# Patient Record
Sex: FEMALE | Race: BLACK OR AFRICAN AMERICAN | NOT HISPANIC OR LATINO | URBAN - METROPOLITAN AREA
[De-identification: names, ages, dates, MRNs, and addresses within clinical notes are randomized per-mention and may not be internally consistent; named-entity substitution may affect disease eponyms.]

---

## 2017-03-29 ENCOUNTER — INPATIENT (INPATIENT)
Facility: HOSPITAL | Age: 82
LOS: 1 days | Discharge: HOME HEALTH | End: 2017-03-31
Attending: INTERNAL MEDICINE | Admitting: INTERNAL MEDICINE
Payer: MEDICARE

## 2017-03-29 VITALS
OXYGEN SATURATION: 93 % | TEMPERATURE: 98 F | DIASTOLIC BLOOD PRESSURE: 89 MMHG | HEIGHT: 68 IN | SYSTOLIC BLOOD PRESSURE: 169 MMHG | WEIGHT: 149.91 LBS | RESPIRATION RATE: 16 BRPM | HEART RATE: 78 BPM

## 2017-03-29 DIAGNOSIS — Z79.82 LONG TERM (CURRENT) USE OF ASPIRIN: ICD-10-CM

## 2017-03-29 DIAGNOSIS — Z29.9 ENCOUNTER FOR PROPHYLACTIC MEASURES, UNSPECIFIED: ICD-10-CM

## 2017-03-29 DIAGNOSIS — Z74.01 BED CONFINEMENT STATUS: ICD-10-CM

## 2017-03-29 DIAGNOSIS — Z88.1 ALLERGY STATUS TO OTHER ANTIBIOTIC AGENTS STATUS: ICD-10-CM

## 2017-03-29 DIAGNOSIS — F01.50 VASCULAR DEMENTIA WITHOUT BEHAVIORAL DISTURBANCE: ICD-10-CM

## 2017-03-29 DIAGNOSIS — G93.41 METABOLIC ENCEPHALOPATHY: ICD-10-CM

## 2017-03-29 DIAGNOSIS — L89.894 PRESSURE ULCER OF OTHER SITE, STAGE 4: ICD-10-CM

## 2017-03-29 DIAGNOSIS — Z51.5 ENCOUNTER FOR PALLIATIVE CARE: ICD-10-CM

## 2017-03-29 DIAGNOSIS — R53.2 FUNCTIONAL QUADRIPLEGIA: ICD-10-CM

## 2017-03-29 DIAGNOSIS — Z86.73 PERSONAL HISTORY OF TRANSIENT ISCHEMIC ATTACK (TIA), AND CEREBRAL INFARCTION WITHOUT RESIDUAL DEFICITS: ICD-10-CM

## 2017-03-29 DIAGNOSIS — N30.01 ACUTE CYSTITIS WITH HEMATURIA: ICD-10-CM

## 2017-03-29 DIAGNOSIS — Z79.84 LONG TERM (CURRENT) USE OF ORAL HYPOGLYCEMIC DRUGS: ICD-10-CM

## 2017-03-29 DIAGNOSIS — R47.01 APHASIA: ICD-10-CM

## 2017-03-29 DIAGNOSIS — L89.624 PRESSURE ULCER OF LEFT HEEL, STAGE 4: ICD-10-CM

## 2017-03-29 DIAGNOSIS — I69.354 HEMIPLEGIA AND HEMIPARESIS FOLLOWING CEREBRAL INFARCTION AFFECTING LEFT NON-DOMINANT SIDE: ICD-10-CM

## 2017-03-29 DIAGNOSIS — Z66 DO NOT RESUSCITATE: ICD-10-CM

## 2017-03-29 DIAGNOSIS — E11.00 TYPE 2 DIABETES MELLITUS WITH HYPEROSMOLARITY WITHOUT NONKETOTIC HYPERGLYCEMIC-HYPEROSMOLAR COMA (NKHHC): ICD-10-CM

## 2017-03-29 DIAGNOSIS — I63.9 CEREBRAL INFARCTION, UNSPECIFIED: ICD-10-CM

## 2017-03-29 DIAGNOSIS — L89.614 PRESSURE ULCER OF RIGHT HEEL, STAGE 4: ICD-10-CM

## 2017-03-29 DIAGNOSIS — E05.90 THYROTOXICOSIS, UNSPECIFIED WITHOUT THYROTOXIC CRISIS OR STORM: ICD-10-CM

## 2017-03-29 DIAGNOSIS — Z79.4 LONG TERM (CURRENT) USE OF INSULIN: ICD-10-CM

## 2017-03-29 DIAGNOSIS — E13.00 OTHER SPECIFIED DIABETES MELLITUS WITH HYPEROSMOLARITY WITHOUT NONKETOTIC HYPERGLYCEMIC-HYPEROSMOLAR COMA (NKHHC): ICD-10-CM

## 2017-03-29 DIAGNOSIS — B96.1 KLEBSIELLA PNEUMONIAE [K. PNEUMONIAE] AS THE CAUSE OF DISEASES CLASSIFIED ELSEWHERE: ICD-10-CM

## 2017-03-29 LAB
ALBUMIN SERPL ELPH-MCNC: 2.7 G/DL — LOW (ref 3.3–5)
ALP SERPL-CCNC: 79 U/L — SIGNIFICANT CHANGE UP (ref 40–120)
ALT FLD-CCNC: 10 U/L — LOW (ref 12–78)
ANION GAP SERPL CALC-SCNC: 10 MMOL/L — SIGNIFICANT CHANGE UP (ref 5–17)
APPEARANCE UR: ABNORMAL
APTT BLD: 31.3 SEC — SIGNIFICANT CHANGE UP (ref 27.5–37.4)
AST SERPL-CCNC: 15 U/L — SIGNIFICANT CHANGE UP (ref 15–37)
BACTERIA # UR AUTO: ABNORMAL
BASE EXCESS BLDA CALC-SCNC: 3.7 MMOL/L — HIGH (ref -2–2)
BASOPHILS # BLD AUTO: 0.2 K/UL — SIGNIFICANT CHANGE UP (ref 0–0.2)
BASOPHILS NFR BLD AUTO: 2.4 % — HIGH (ref 0–2)
BILIRUB SERPL-MCNC: 0.4 MG/DL — SIGNIFICANT CHANGE UP (ref 0.2–1.2)
BILIRUB UR-MCNC: NEGATIVE — SIGNIFICANT CHANGE UP
BLOOD GAS COMMENTS: SIGNIFICANT CHANGE UP
BLOOD GAS SOURCE: SIGNIFICANT CHANGE UP
BUN SERPL-MCNC: 10 MG/DL — SIGNIFICANT CHANGE UP (ref 7–23)
CALCIUM SERPL-MCNC: 8.7 MG/DL — SIGNIFICANT CHANGE UP (ref 8.5–10.1)
CHLORIDE SERPL-SCNC: 100 MMOL/L — SIGNIFICANT CHANGE UP (ref 96–108)
CK MB BLD-MCNC: 1.5 % — SIGNIFICANT CHANGE UP (ref 0–3.5)
CK MB CFR SERPL CALC: 1.3 NG/ML — SIGNIFICANT CHANGE UP (ref 0.5–3.6)
CK SERPL-CCNC: 84 U/L — SIGNIFICANT CHANGE UP (ref 26–192)
CO2 SERPL-SCNC: 31 MMOL/L — SIGNIFICANT CHANGE UP (ref 22–31)
COLOR SPEC: YELLOW — SIGNIFICANT CHANGE UP
CREAT SERPL-MCNC: 0.67 MG/DL — SIGNIFICANT CHANGE UP (ref 0.5–1.3)
DIFF PNL FLD: ABNORMAL
EOSINOPHIL # BLD AUTO: 0.2 K/UL — SIGNIFICANT CHANGE UP (ref 0–0.5)
EOSINOPHIL NFR BLD AUTO: 2.1 % — SIGNIFICANT CHANGE UP (ref 0–6)
EPI CELLS # UR: ABNORMAL
GLUCOSE SERPL-MCNC: 123 MG/DL — HIGH (ref 70–99)
GLUCOSE UR QL: NEGATIVE MG/DL — SIGNIFICANT CHANGE UP
HCO3 BLDA-SCNC: 27 MMOL/L — SIGNIFICANT CHANGE UP (ref 21–29)
HCT VFR BLD CALC: 39.6 % — SIGNIFICANT CHANGE UP (ref 34.5–45)
HGB BLD-MCNC: 13.4 G/DL — SIGNIFICANT CHANGE UP (ref 11.5–15.5)
HOROWITZ INDEX BLDA+IHG-RTO: 21 — SIGNIFICANT CHANGE UP
INR BLD: 1.11 RATIO — SIGNIFICANT CHANGE UP (ref 0.88–1.16)
KETONES UR-MCNC: NEGATIVE — SIGNIFICANT CHANGE UP
LEUKOCYTE ESTERASE UR-ACNC: ABNORMAL
LYMPHOCYTES # BLD AUTO: 3.8 K/UL — HIGH (ref 1–3.3)
LYMPHOCYTES # BLD AUTO: 45.6 % — HIGH (ref 13–44)
MCHC RBC-ENTMCNC: 30.6 PG — SIGNIFICANT CHANGE UP (ref 27–34)
MCHC RBC-ENTMCNC: 33.9 GM/DL — SIGNIFICANT CHANGE UP (ref 32–36)
MCV RBC AUTO: 90.3 FL — SIGNIFICANT CHANGE UP (ref 80–100)
MONOCYTES # BLD AUTO: 0.7 K/UL — SIGNIFICANT CHANGE UP (ref 0–0.9)
MONOCYTES NFR BLD AUTO: 8.8 % — SIGNIFICANT CHANGE UP (ref 2–14)
NEUTROPHILS # BLD AUTO: 3.5 K/UL — SIGNIFICANT CHANGE UP (ref 1.8–7.4)
NEUTROPHILS NFR BLD AUTO: 41.1 % — LOW (ref 43–77)
NITRITE UR-MCNC: POSITIVE
PCO2 BLDA: 39 MMHG — SIGNIFICANT CHANGE UP (ref 32–46)
PH BLD: 7.46 — HIGH (ref 7.35–7.45)
PH UR: 6.5 — SIGNIFICANT CHANGE UP (ref 4.8–8)
PLATELET # BLD AUTO: 260 K/UL — SIGNIFICANT CHANGE UP (ref 150–400)
PO2 BLDA: 96 MMHG — SIGNIFICANT CHANGE UP (ref 74–108)
POTASSIUM SERPL-MCNC: 4.1 MMOL/L — SIGNIFICANT CHANGE UP (ref 3.5–5.3)
POTASSIUM SERPL-SCNC: 4.1 MMOL/L — SIGNIFICANT CHANGE UP (ref 3.5–5.3)
PROT SERPL-MCNC: 7.1 GM/DL — SIGNIFICANT CHANGE UP (ref 6–8.3)
PROT UR-MCNC: 30 MG/DL
PROTHROM AB SERPL-ACNC: 12.1 SEC — SIGNIFICANT CHANGE UP (ref 9.8–12.7)
RBC # BLD: 4.39 M/UL — SIGNIFICANT CHANGE UP (ref 3.8–5.2)
RBC # FLD: 13.1 % — SIGNIFICANT CHANGE UP (ref 11–15)
RBC CASTS # UR COMP ASSIST: ABNORMAL /HPF (ref 0–4)
SAO2 % BLDA: 97 % — HIGH (ref 92–96)
SODIUM SERPL-SCNC: 141 MMOL/L — SIGNIFICANT CHANGE UP (ref 135–145)
SP GR SPEC: 1.01 — SIGNIFICANT CHANGE UP (ref 1.01–1.02)
TROPONIN I SERPL-MCNC: 0.02 NG/ML — SIGNIFICANT CHANGE UP (ref 0.01–0.04)
UROBILINOGEN FLD QL: NEGATIVE MG/DL — SIGNIFICANT CHANGE UP
WBC # BLD: 8.4 K/UL — SIGNIFICANT CHANGE UP (ref 3.8–10.5)
WBC # FLD AUTO: 8.4 K/UL — SIGNIFICANT CHANGE UP (ref 3.8–10.5)
WBC UR QL: ABNORMAL

## 2017-03-29 PROCEDURE — 70450 CT HEAD/BRAIN W/O DYE: CPT | Mod: 26

## 2017-03-29 PROCEDURE — 71010: CPT | Mod: 26

## 2017-03-29 PROCEDURE — 99285 EMERGENCY DEPT VISIT HI MDM: CPT

## 2017-03-29 PROCEDURE — 99223 1ST HOSP IP/OBS HIGH 75: CPT

## 2017-03-29 RX ORDER — ASPIRIN/CALCIUM CARB/MAGNESIUM 324 MG
325 TABLET ORAL ONCE
Qty: 0 | Refills: 0 | Status: DISCONTINUED | OUTPATIENT
Start: 2017-03-29 | End: 2017-03-29

## 2017-03-29 RX ORDER — DEXTROSE 50 % IN WATER 50 %
25 SYRINGE (ML) INTRAVENOUS ONCE
Qty: 0 | Refills: 0 | Status: DISCONTINUED | OUTPATIENT
Start: 2017-03-29 | End: 2017-03-31

## 2017-03-29 RX ORDER — CEFTRIAXONE 500 MG/1
1 INJECTION, POWDER, FOR SOLUTION INTRAMUSCULAR; INTRAVENOUS EVERY 24 HOURS
Qty: 0 | Refills: 0 | Status: DISCONTINUED | OUTPATIENT
Start: 2017-03-30 | End: 2017-03-31

## 2017-03-29 RX ORDER — CEFTRIAXONE 500 MG/1
INJECTION, POWDER, FOR SOLUTION INTRAMUSCULAR; INTRAVENOUS
Qty: 0 | Refills: 0 | Status: DISCONTINUED | OUTPATIENT
Start: 2017-03-29 | End: 2017-03-31

## 2017-03-29 RX ORDER — DEXTROSE 50 % IN WATER 50 %
12.5 SYRINGE (ML) INTRAVENOUS ONCE
Qty: 0 | Refills: 0 | Status: DISCONTINUED | OUTPATIENT
Start: 2017-03-29 | End: 2017-03-31

## 2017-03-29 RX ORDER — ASPIRIN/CALCIUM CARB/MAGNESIUM 324 MG
81 TABLET ORAL DAILY
Qty: 0 | Refills: 0 | Status: DISCONTINUED | OUTPATIENT
Start: 2017-03-30 | End: 2017-03-31

## 2017-03-29 RX ORDER — GLUCAGON INJECTION, SOLUTION 0.5 MG/.1ML
1 INJECTION, SOLUTION SUBCUTANEOUS ONCE
Qty: 0 | Refills: 0 | Status: DISCONTINUED | OUTPATIENT
Start: 2017-03-29 | End: 2017-03-31

## 2017-03-29 RX ORDER — INSULIN LISPRO 100/ML
VIAL (ML) SUBCUTANEOUS
Qty: 0 | Refills: 0 | Status: DISCONTINUED | OUTPATIENT
Start: 2017-03-29 | End: 2017-03-31

## 2017-03-29 RX ORDER — DEXTROSE 50 % IN WATER 50 %
1 SYRINGE (ML) INTRAVENOUS ONCE
Qty: 0 | Refills: 0 | Status: DISCONTINUED | OUTPATIENT
Start: 2017-03-29 | End: 2017-03-31

## 2017-03-29 RX ORDER — SODIUM CHLORIDE 9 MG/ML
1000 INJECTION INTRAMUSCULAR; INTRAVENOUS; SUBCUTANEOUS
Qty: 0 | Refills: 0 | Status: DISCONTINUED | OUTPATIENT
Start: 2017-03-29 | End: 2017-03-30

## 2017-03-29 RX ORDER — ASPIRIN/CALCIUM CARB/MAGNESIUM 324 MG
300 TABLET ORAL ONCE
Qty: 0 | Refills: 0 | Status: COMPLETED | OUTPATIENT
Start: 2017-03-29 | End: 2017-03-29

## 2017-03-29 RX ORDER — CEFTRIAXONE 500 MG/1
1 INJECTION, POWDER, FOR SOLUTION INTRAMUSCULAR; INTRAVENOUS ONCE
Qty: 0 | Refills: 0 | Status: COMPLETED | OUTPATIENT
Start: 2017-03-29 | End: 2017-03-29

## 2017-03-29 RX ORDER — ATORVASTATIN CALCIUM 80 MG/1
10 TABLET, FILM COATED ORAL AT BEDTIME
Qty: 0 | Refills: 0 | Status: DISCONTINUED | OUTPATIENT
Start: 2017-03-29 | End: 2017-03-31

## 2017-03-29 RX ORDER — HEPARIN SODIUM 5000 [USP'U]/ML
5000 INJECTION INTRAVENOUS; SUBCUTANEOUS EVERY 12 HOURS
Qty: 0 | Refills: 0 | Status: DISCONTINUED | OUTPATIENT
Start: 2017-03-29 | End: 2017-03-31

## 2017-03-29 RX ORDER — SODIUM CHLORIDE 9 MG/ML
1000 INJECTION, SOLUTION INTRAVENOUS
Qty: 0 | Refills: 0 | Status: DISCONTINUED | OUTPATIENT
Start: 2017-03-29 | End: 2017-03-31

## 2017-03-29 RX ADMIN — Medication 300 MILLIGRAM(S): at 20:39

## 2017-03-29 RX ADMIN — SODIUM CHLORIDE 50 MILLILITER(S): 9 INJECTION INTRAMUSCULAR; INTRAVENOUS; SUBCUTANEOUS at 20:43

## 2017-03-29 RX ADMIN — CEFTRIAXONE 100 GRAM(S): 500 INJECTION, POWDER, FOR SOLUTION INTRAMUSCULAR; INTRAVENOUS at 20:43

## 2017-03-29 NOTE — ED PROVIDER NOTE - OBJECTIVE STATEMENT
89 years old female by ems with daughters c/o pt is not moving her left side and eyes are deviated to the right side for 2 to 3 days. Pt s' daughter pt has a hx of dementia and not speaking for talking for at least two years now. Pt is alert but not oriented not following verbal commends unable to give detail hx.

## 2017-03-29 NOTE — H&P ADULT. - PROBLEM SELECTOR PLAN 1
aspirin 81 day  statin daily   speech and swallow   neurology consult   mri brain  pt consult  lipid panel  piper correia

## 2017-03-29 NOTE — CONSULT NOTE ADULT - ASSESSMENT
consult dict r gaze preferance ct head r cva no bleed left arm 0/5 left srm 3/5 weakness for 2 days discuss with family supportive care  palliative  care .

## 2017-03-29 NOTE — H&P ADULT. - PMH
Acute cystitis with hematuria    CVA (cerebral vascular accident)    Dementia    Diabetes    Hypertension    Hyperthyroidism

## 2017-03-29 NOTE — H&P ADULT. - HISTORY OF PRESENT ILLNESS
89 yo female pmh of cva no residual hyperthyroidism htn dm dementia not on medication for 8  months. pt was noted unresponsive with right gaze deviation and neck turned to the right side and left upper and lower extremity weakness onset Monday midday. pt was in her USOH non ambulatory bedbound except for being lifted to wheelchair eating puree diet brought in to er for above   labs significant for uti in er , ct head shown old mca infarct multiple lacunar infarcts old and extensive microvascular disease

## 2017-03-29 NOTE — ED PROVIDER NOTE - PROGRESS NOTE DETAILS
Pt is not a tPA candidate because the onset ot the symptoms last more than 2 days. Dr. Perez is notified and admit pt. He sts he is calling Dr. Alvarado neurologist himself now for neuro consult.

## 2017-03-29 NOTE — ED ADULT NURSE NOTE - OBJECTIVE STATEMENT
Pt presented to the er with right sided gaze for the past 3 days. Pt move the right side bi=ut doesn't respond to pain or move the left side.   Pt had a prior stoke. Pt is  bedbound and has hx of dementia. As per daughter Pt is not on any medication.

## 2017-03-29 NOTE — ED PROVIDER NOTE - CONSTITUTIONAL, MLM
normal... Well appearing, well nourished, awake, alert,  no apparent distress. No nasal flaring no shoulders retractions, no diaphoresis

## 2017-03-29 NOTE — H&P ADULT. - REASON FOR ADMISSION
noted unresponsive with right gaze deviation and neck turned to the right side and left upper and lower extremity weakness noted Monday midday pt was ir her usoh non ambulatory bedbound except for being lifted to wheelchair eating pruee diet

## 2017-03-30 DIAGNOSIS — R53.2 FUNCTIONAL QUADRIPLEGIA: ICD-10-CM

## 2017-03-30 DIAGNOSIS — G93.41 METABOLIC ENCEPHALOPATHY: ICD-10-CM

## 2017-03-30 LAB
ALBUMIN SERPL ELPH-MCNC: 2.6 G/DL — LOW (ref 3.3–5)
ALP SERPL-CCNC: 70 U/L — SIGNIFICANT CHANGE UP (ref 40–120)
ALT FLD-CCNC: 8 U/L — LOW (ref 12–78)
ANION GAP SERPL CALC-SCNC: 8 MMOL/L — SIGNIFICANT CHANGE UP (ref 5–17)
AST SERPL-CCNC: 16 U/L — SIGNIFICANT CHANGE UP (ref 15–37)
BASOPHILS # BLD AUTO: 0.2 K/UL — SIGNIFICANT CHANGE UP (ref 0–0.2)
BASOPHILS NFR BLD AUTO: 2.9 % — HIGH (ref 0–2)
BILIRUB SERPL-MCNC: 0.4 MG/DL — SIGNIFICANT CHANGE UP (ref 0.2–1.2)
BUN SERPL-MCNC: 8 MG/DL — SIGNIFICANT CHANGE UP (ref 7–23)
CALCIUM SERPL-MCNC: 8.5 MG/DL — SIGNIFICANT CHANGE UP (ref 8.5–10.1)
CHLORIDE SERPL-SCNC: 105 MMOL/L — SIGNIFICANT CHANGE UP (ref 96–108)
CHOLEST SERPL-MCNC: 179 MG/DL — SIGNIFICANT CHANGE UP (ref 10–199)
CO2 SERPL-SCNC: 30 MMOL/L — SIGNIFICANT CHANGE UP (ref 22–31)
CREAT SERPL-MCNC: 0.55 MG/DL — SIGNIFICANT CHANGE UP (ref 0.5–1.3)
EOSINOPHIL # BLD AUTO: 0.2 K/UL — SIGNIFICANT CHANGE UP (ref 0–0.5)
EOSINOPHIL NFR BLD AUTO: 3.7 % — SIGNIFICANT CHANGE UP (ref 0–6)
GLUCOSE SERPL-MCNC: 94 MG/DL — SIGNIFICANT CHANGE UP (ref 70–99)
HBA1C BLD-MCNC: 6.8 % — HIGH (ref 4–5.6)
HCT VFR BLD CALC: 36.4 % — SIGNIFICANT CHANGE UP (ref 34.5–45)
HDLC SERPL-MCNC: 59 MG/DL — SIGNIFICANT CHANGE UP (ref 40–125)
HGB BLD-MCNC: 12.8 G/DL — SIGNIFICANT CHANGE UP (ref 11.5–15.5)
LIPID PNL WITH DIRECT LDL SERPL: 106 MG/DL — SIGNIFICANT CHANGE UP
LYMPHOCYTES # BLD AUTO: 2.3 K/UL — SIGNIFICANT CHANGE UP (ref 1–3.3)
LYMPHOCYTES # BLD AUTO: 40.4 % — SIGNIFICANT CHANGE UP (ref 13–44)
MCHC RBC-ENTMCNC: 32 PG — SIGNIFICANT CHANGE UP (ref 27–34)
MCHC RBC-ENTMCNC: 35 GM/DL — SIGNIFICANT CHANGE UP (ref 32–36)
MCV RBC AUTO: 91.4 FL — SIGNIFICANT CHANGE UP (ref 80–100)
MONOCYTES # BLD AUTO: 0.7 K/UL — SIGNIFICANT CHANGE UP (ref 0–0.9)
MONOCYTES NFR BLD AUTO: 12.4 % — SIGNIFICANT CHANGE UP (ref 2–14)
NEUTROPHILS # BLD AUTO: 2.3 K/UL — SIGNIFICANT CHANGE UP (ref 1.8–7.4)
NEUTROPHILS NFR BLD AUTO: 40.7 % — LOW (ref 43–77)
PLATELET # BLD AUTO: 229 K/UL — SIGNIFICANT CHANGE UP (ref 150–400)
POTASSIUM SERPL-MCNC: 4 MMOL/L — SIGNIFICANT CHANGE UP (ref 3.5–5.3)
POTASSIUM SERPL-SCNC: 4 MMOL/L — SIGNIFICANT CHANGE UP (ref 3.5–5.3)
PROT SERPL-MCNC: 6.7 GM/DL — SIGNIFICANT CHANGE UP (ref 6–8.3)
RBC # BLD: 3.98 M/UL — SIGNIFICANT CHANGE UP (ref 3.8–5.2)
RBC # FLD: 13 % — SIGNIFICANT CHANGE UP (ref 11–15)
SODIUM SERPL-SCNC: 143 MMOL/L — SIGNIFICANT CHANGE UP (ref 135–145)
TOTAL CHOLESTEROL/HDL RATIO MEASUREMENT: 3 RATIO — LOW (ref 3.3–7.1)
TRIGL SERPL-MCNC: 69 MG/DL — SIGNIFICANT CHANGE UP (ref 10–149)
TROPONIN I SERPL-MCNC: 0.02 NG/ML — SIGNIFICANT CHANGE UP (ref 0.01–0.04)
TSH SERPL-MCNC: 1.1 UU/ML — SIGNIFICANT CHANGE UP (ref 0.36–3.74)
VIT B12 SERPL-MCNC: 1283 PG/ML — HIGH (ref 243–894)
WBC # BLD: 5.6 K/UL — SIGNIFICANT CHANGE UP (ref 3.8–10.5)
WBC # FLD AUTO: 5.6 K/UL — SIGNIFICANT CHANGE UP (ref 3.8–10.5)

## 2017-03-30 PROCEDURE — 93880 EXTRACRANIAL BILAT STUDY: CPT | Mod: 26

## 2017-03-30 PROCEDURE — 99498 ADVNCD CARE PLAN ADDL 30 MIN: CPT

## 2017-03-30 PROCEDURE — 99497 ADVNCD CARE PLAN 30 MIN: CPT

## 2017-03-30 PROCEDURE — 99233 SBSQ HOSP IP/OBS HIGH 50: CPT

## 2017-03-30 RX ORDER — SODIUM CHLORIDE 9 MG/ML
1000 INJECTION, SOLUTION INTRAVENOUS
Qty: 0 | Refills: 0 | Status: DISCONTINUED | OUTPATIENT
Start: 2017-03-30 | End: 2017-03-30

## 2017-03-30 RX ADMIN — ATORVASTATIN CALCIUM 10 MILLIGRAM(S): 80 TABLET, FILM COATED ORAL at 21:39

## 2017-03-30 RX ADMIN — Medication 81 MILLIGRAM(S): at 15:07

## 2017-03-30 RX ADMIN — CEFTRIAXONE 100 GRAM(S): 500 INJECTION, POWDER, FOR SOLUTION INTRAMUSCULAR; INTRAVENOUS at 20:26

## 2017-03-30 RX ADMIN — HEPARIN SODIUM 5000 UNIT(S): 5000 INJECTION INTRAVENOUS; SUBCUTANEOUS at 05:37

## 2017-03-30 RX ADMIN — SODIUM CHLORIDE 50 MILLILITER(S): 9 INJECTION, SOLUTION INTRAVENOUS at 15:07

## 2017-03-30 RX ADMIN — HEPARIN SODIUM 5000 UNIT(S): 5000 INJECTION INTRAVENOUS; SUBCUTANEOUS at 18:19

## 2017-03-30 NOTE — SWALLOW BEDSIDE ASSESSMENT ADULT - PHARYNGEAL PHASE
Delayed pharyngeal swallow/Decreased laryngeal elevation Decreased laryngeal elevation/Delayed pharyngeal swallow

## 2017-03-30 NOTE — SWALLOW BEDSIDE ASSESSMENT ADULT - SLP GENERAL OBSERVATIONS
Pt was seen bedside alert and nonverbal, essentially noncommunicative except facial grimace. Pt did not follow 1-step directions. Daughter present bedside.

## 2017-03-30 NOTE — GOALS OF CARE CONVERSATION - PERSONAL ADVANCE DIRECTIVE - CONVERSATION DETAILS
89 yo female pmh of cva no residual hyperthyroidism htn dm dementia not on medication for 8  months. pt was noted unresponsive with right gaze deviation and neck turned to the right side and left upper and lower extremity weakness onset Monday midday. admitted for  for uti in ER , CT head shown old mca infarct multiple lacunar infarcts old and extensive microvascular disease. Met with daughter Chuyita Michaud and discussed goals of care and advanced care planning. MOLST FORM completed.

## 2017-03-30 NOTE — DISCHARGE NOTE ADULT - PATIENT PORTAL LINK FT
“You can access the Follow[employer] Patient Portal, offered by [facility], by registering with the following website: [email or web address]”

## 2017-03-30 NOTE — DIETITIAN INITIAL EVALUATION ADULT. - ENERGY NEEDS
Height (cm): 172.7 (03-29)  Weight (kg): 68 (03-29)  BMI (kg/m2): 22.8 (03-29)  IBW:  63.6kg +/- 10%        % IBW:  107%        UBW:  unknown     %UBW: unknown

## 2017-03-30 NOTE — DIETITIAN INITIAL EVALUATION ADULT. - NS AS NUTRI INTERV MEALS SNACK
When/if medically appropriate, advance to solid diet: recommend Regular diet, altered consistency per SLP;  consider alternate means of nutrition if pt not able to consume PO intake; recommend Glucerna 1.2 to goal rate of 60 ml/hr (provides 1728 kcal, 86 g protein)/General/healthful diet General/healthful diet/When/if medically appropriate, advance to solid diet: recommend Regular diet, altered consistency per SLP; pureed/thin liquids When/if medically appropriate, advance to solid diet: recommend Regular diet, altered consistency per SLP; pureed/thin liquids/General/healthful diet/Texture-modified diet

## 2017-03-30 NOTE — DISCHARGE NOTE ADULT - MEDICATION SUMMARY - MEDICATIONS TO STOP TAKING
I will STOP taking the medications listed below when I get home from the hospital:    glipiZIDE  --  by mouth    Macrobid 100 mg oral capsule  -- 1 cap(s) by mouth 2 times a day  -- Finish all this medication unless otherwise directed by prescriber.  May discolor urine or feces.  Take with food or milk.

## 2017-03-30 NOTE — SWALLOW BEDSIDE ASSESSMENT ADULT - SPECIFY REASON(S)
MD order states NPO until further recommendations made MD order states NPO until further recommendations made.

## 2017-03-30 NOTE — SWALLOW BEDSIDE ASSESSMENT ADULT - SWALLOW EVAL: DIAGNOSIS
Pt is a 89 y o female admitted r/o CVA with old R MCA infarct presented with oropharyngeal phases of the swallow marked by reduced opening of oral cavity for po acceptance, oral holding, suspect delay in swallow trigger, and reduced laryngeal elevation. Pt behaviorally refused po trials of soft solid. No overt signs of aspiration. Pt is a 89 y o female admitted r/o CVA with old R MCA infarct presented with oropharyngeal phases of the swallow marked by reduced opening of oral cavity for po acceptance, anterior loss of bolus, oral holding, suspect delay in swallow trigger, and reduced laryngeal elevation. Pt behaviorally refused po trials of soft solid. No overt signs of aspiration.

## 2017-03-30 NOTE — DIETITIAN INITIAL EVALUATION ADULT. - NUTRITIONGOAL OUTCOME1
Pt to maintain wt +/- 2#; meet > 75% energy /protein needs via PO or tube feeding as appropriate Pt to maintain wt +/- 2#; meet > 75% energy /protein needs during hospitalization

## 2017-03-30 NOTE — SWALLOW BEDSIDE ASSESSMENT ADULT - SWALLOW EVAL: PATIENT/FAMILY GOALS STATEMENT
Pt's daughter stated that pt inconsistently coughs on thin liquid at home. She also said pt had decreased appetite the past week.

## 2017-03-30 NOTE — DISCHARGE NOTE ADULT - MEDICATION SUMMARY - MEDICATIONS TO TAKE
I will START or STAY ON the medications listed below when I get home from the hospital:    aspirin 81 mg oral delayed release tablet  -- 1 tab(s) by mouth once a day  -- Indication: For CEREBRAL VASCULAR ACCIDENT    metFORMIN 500 mg oral tablet  -- 1 tab(s) by mouth 2 times a day  -- Check with your doctor before becoming pregnant.  Do not drink alcoholic beverages when taking this medication.  It is very important that you take or use this exactly as directed.  Do not skip doses or discontinue unless directed by your doctor.  Obtain medical advice before taking any non-prescription drugs as some may affect the action of this medication.  Take with food or milk.    -- Indication: For Diabetes mellitus of other type with hyperosmolarity, with long-term current use of insulin    atorvastatin 10 mg oral tablet  -- 1 tab(s) by mouth once a day (at bedtime)  -- Indication: For CEREBRAL VASCULAR ACCIDENT    risperidone 0.5 mg oral tablet  --  by mouth once a day  -- Indication: For Dementia    methimazole 5 mg oral tablet  --  by mouth once a day  -- Indication: For Hyperthyroidism    Toprol-XL 50 mg oral tablet, extended release  -- 1 tab(s) by mouth once a day  -- It is very important that you take or use this exactly as directed.  Do not skip doses or discontinue unless directed by your doctor.  May cause drowsiness.  Alcohol may intensify this effect.  Use care when operating dangerous machinery.  Some non-prescription drugs may aggravate your condition.  Read all labels carefully.  If a warning appears, check with your doctor before taking.  Swallow whole.  Do not crush.  Take with food or milk.  This drug may impair the ability to drive or operate machinery.  Use care until you become familiar with its effects.    -- Indication: For Hypertension    cefaclor 500 mg oral capsule  -- 1 cap(s) by mouth 3 times a day  -- Finish all this medication unless otherwise directed by prescriber.    -- Indication: For Acute cystitis with hematuria    Zinc  -- 100 milligram(s) by mouth once a day  -- Indication: For Acute cystitis with hematuria

## 2017-03-30 NOTE — DISCHARGE NOTE ADULT - SECONDARY DIAGNOSIS.
Quadriplegia, functional Acute cystitis with hematuria CVA (cerebral vascular accident) Diabetes mellitus of other type with hyperosmolarity, with long-term current use of insulin

## 2017-03-30 NOTE — DISCHARGE NOTE ADULT - CARE PLAN
Principal Discharge DX:	Metabolic encephalopathy  Goal:	return to baseline mental status  Instructions for follow-up, activity and diet:	treat underlying medical condition  Secondary Diagnosis:	Quadriplegia, functional  Instructions for follow-up, activity and diet:	frequent turning, out of bed to chair  Secondary Diagnosis:	Acute cystitis with hematuria  Instructions for follow-up, activity and diet:	complete antibiotics as ordered  Secondary Diagnosis:	CVA (cerebral vascular accident)  Instructions for follow-up, activity and diet:	aspirin and statin  Secondary Diagnosis:	Diabetes mellitus of other type with hyperosmolarity, with long-term current use of insulin  Instructions for follow-up, activity and diet:	can stop glipizide, cont with metformin Principal Discharge DX:	Metabolic encephalopathy  Goal:	return to baseline mental status  Instructions for follow-up, activity and diet:	treat underlying medical condition  Secondary Diagnosis:	Quadriplegia, functional  Instructions for follow-up, activity and diet:	frequent turning, out of bed to chair  Secondary Diagnosis:	Acute cystitis with hematuria  Instructions for follow-up, activity and diet:	complete antibiotics as ordered  Secondary Diagnosis:	CVA (cerebral vascular accident)  Instructions for follow-up, activity and diet:	aspirin and statin for HISTORY OF STROKE, no evidence of acute stroke  Secondary Diagnosis:	Diabetes mellitus of other type with hyperosmolarity, with long-term current use of insulin  Instructions for follow-up, activity and diet:	can stop glipizide, cont with metformin

## 2017-03-30 NOTE — SWALLOW BEDSIDE ASSESSMENT ADULT - SLP PERTINENT HISTORY OF CURRENT PROBLEM
89 yo female pmh of cva no residual hyperthyroidism htn dm dementia not on medication for 8  months. pt was noted unresponsive with right gaze deviation and neck turned to the right side and left upper and lower extremity weakness onset Monday midday. pt was in her USOH non ambulatory bedbound except for being lifted to wheelchair eating puree diet brought in to er for above

## 2017-03-30 NOTE — DIETITIAN INITIAL EVALUATION ADULT. - PERTINENT LABORATORY DATA
03-30 Na143 mmol/L Glu 94 mg/dL K+ 4.0 mmol/L Cr  0.55 mg/dL BUN 8 mg/dL Phos n/a   Alb 2.6 g/dL<L> PAB n/a, HgbA1c 6.8H

## 2017-03-30 NOTE — GOALS OF CARE CONVERSATION - PERSONAL ADVANCE DIRECTIVE - WHAT MATTERS MOST
MOLST form completed, DNR/DNI Comfort measures only. Trial period for tube feeding. Educated on Hospice care also. Daughter stated if the antibiotic treatment fails then will consider for Hospice care.

## 2017-03-30 NOTE — DISCHARGE NOTE ADULT - NS AS DC STROKE ED MATERIALS
Risk Factors for Stroke/Stroke Warning Signs and Symptoms/Prescribed Medications/Need for Followup After Discharge/Stroke Education Booklet/Call 911 for Stroke

## 2017-03-30 NOTE — DISCHARGE NOTE ADULT - HOSPITAL COURSE
89 yo female pmh of cva no residual hyperthyroidism htn dm dementia not on medication for 8  months. pt was noted unresponsive with right gaze deviation and neck turned to the right side and left upper and lower extremity weakness onset Monday midday. pt was in her USOH non ambulatory bedbound except for being lifted to wheelchair eating puree diet brought in to er for above   labs significant for uti in er , ct head shown old mca infarct multiple lacunar infarcts old and extensive microvascular disease    Pt. returned to baseline, no events on tele, ct head neg for acute findings, no further episodes as above. Pt. likely with acute metabolic encephalopathy 2/2 uti. Mental status improving with treatment of uti - pt. to complete course of oral abx at home. 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 Aide to be reinstated. pt. with h/o stroke so aspirin and statin started. supportive care for dementia.   palliative care met with daughter and an outpatient hospice consult was ordered for possible home hospice 89 yo female pmh of cva no residual hyperthyroidism htn dm dementia not on medication for 8  months. pt was noted unresponsive with right gaze deviation and neck turned to the right side and left upper and lower extremity weakness onset Monday midday. pt was in her USOH non ambulatory bedbound except for being lifted to wheelchair eating puree diet brought in to er for above   labs significant for uti in er , ct head shown old mca infarct multiple lacunar infarcts old and extensive microvascular disease    Pt. returned to baseline, no events on tele, ct head neg for acute findings, no further episodes as above. Pt. likely with acute metabolic encephalopathy 2/2 uti. Mental status improving with treatment of uti - pt. to complete course of oral abx at home. 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 Aide to be reinstated. pt. with h/o stroke so aspirin and statin started. supportive care for dementia.   palliative care met with daughter and an outpatient hospice consult was ordered for possible home hospital

## 2017-03-30 NOTE — PROGRESS NOTE ADULT - SUBJECTIVE AND OBJECTIVE BOX
cc: AMS      INTERVAL HPI/OVERNIGHT EVENTS:  no events, daughter at bedside feels that mental status is at baseline    MEDICATIONS  (STANDING):  insulin lispro (HumaLOG) corrective regimen sliding scale  SubCutaneous three times a day before meals  dextrose 5%. 1000milliLiter(s) IV Continuous <Continuous>  dextrose 50% Injectable 12.5Gram(s) IV Push once  cefTRIAXone   IVPB 1Gram(s) IV Intermittent every 24 hours  dextrose 50% Injectable 25Gram(s) IV Push once  dextrose 50% Injectable 25Gram(s) IV Push once  cefTRIAXone   IVPB  IV Intermittent   heparin  Injectable 5000Unit(s) SubCutaneous every 12 hours  atorvastatin 10milliGRAM(s) Oral at bedtime  aspirin enteric coated 81milliGRAM(s) Oral daily  dextrose 5% + sodium chloride 0.9%. 1000milliLiter(s) IV Continuous <Continuous>    MEDICATIONS  (PRN):  dextrose Gel 1Dose(s) Oral once PRN Blood Glucose LESS THAN 70 milliGRAM(s)/deciliter  glucagon  Injectable 1milliGRAM(s) IntraMuscular once PRN Glucose LESS THAN 70 milligrams/deciliter      Allergies    Levaquin (Unknown)    Intolerances        REVIEW OF SYSTEMS:  unable to assess due to advanced dementia    Vital Signs Last 24 Hrs  T(C): 36.9, Max: 37.4 (- @ 20:58)  T(F): 98.4, Max: 99.4 (- @ 20:58)  HR: 74 (62 - 76)  BP: 148/70 (129/62 - 179/59)  BP(mean): --  RR: 16 (16 - 18)  SpO2: 99% (95% - 99%)    PHYSICAL EXAM:  GENERAL: NAD, non verbal, bed bound  HEAD:  Atraumatic, Normocephalic  EYES: EOMI, PERRLA, conjunctiva and sclera clear  ENMT: No tonsillar erythema, exudates, or enlargement; Moist mucous membranes,   NECK: Supple, No JVD, Normal thyroid  NERVOUS SYSTEM:  Alert, non-verbal, bed bound, minmal movement of extremities  CHEST/LUNG: Clear to percussion bilaterally; No rales, rhonchi, wheezing, or rubs  HEART: Regular rate and rhythm; No murmurs, rubs, or gallops  ABDOMEN: Soft, Nontender, Nondistended; Bowel sounds present  EXTREMITIES:  2+ Peripheral Pulses, No clubbing, cyanosis, or edema  LYMPH: No lymphadenopathy noted  SKIN: No rashes or lesions    LABS:                        12.8   5.6   )-----------( 229      ( 30 Mar 2017 07:46 )             36.4     30 Mar 2017 07:46    143    |  105    |  8      ----------------------------<  94     4.0     |  30     |  0.55     Ca    8.5        30 Mar 2017 07:46    TPro  6.7    /  Alb  2.6    /  TBili  0.4    /  DBili  x      /  AST  16     /  ALT  8      /  AlkPhos  70     30 Mar 2017 07:46    PT/INR - ( 29 Mar 2017 18:06 )   PT: 12.1 sec;   INR: 1.11 ratio         PTT - ( 29 Mar 2017 18:06 )  PTT:31.3 sec  Urinalysis Basic - ( 29 Mar 2017 18:20 )    Color: Yellow / Appearance: very cloudy / S.010 / pH: x  Gluc: x / Ketone: Negative  / Bili: Negative / Urobili: Negative mg/dL   Blood: x / Protein: 30 mg/dL / Nitrite: Positive   Leuk Esterase: Moderate / RBC: 6-10 /HPF / WBC 26-50   Sq Epi: x / Non Sq Epi: Moderate / Bacteria: TNTC      CAPILLARY BLOOD GLUCOSE  88 (30 Mar 2017 07:35)      RADIOLOGY & ADDITIONAL TESTS:    Imaging Personally Reviewed:  [ ] YES  [ ] NO    Consultant(s) Notes Reviewed:  [ ] YES  [ ] NO    Care Discussed with Consultants/Other Providers [ ] YES  [ ] NO

## 2017-03-30 NOTE — DIETITIAN INITIAL EVALUATION ADULT. - SIGNS/SYMPTOMS
NPO x 1 day; unresponsive (unable to consume PO intake), intake < calculated requirements NPO x 1 day; lethargy,, intake < calculated requirements

## 2017-03-30 NOTE — PROGRESS NOTE ADULT - ASSESSMENT
r gaze preferance left  arm 1/5 ct head noted r cva for sub acute rehab supportive care ,
89 yo female pmh of cva no residual hyperthyroidism htn dm dementia not on medication for 8  months. pt was noted unresponsive with right gaze deviation and neck turned to the right side and left upper and lower extremity weakness onset Monday midday. pt was in her USOH non ambulatory bedbound except for being lifted to wheelchair eating puree diet brought in to er for above   labs significant for uti in er , ct head shown old mca infarct multiple lacunar infarcts old and extensive microvascular disease

## 2017-03-30 NOTE — SWALLOW BEDSIDE ASSESSMENT ADULT - COMMENTS
CXR 3/29/2017 Impression: Clear  lungs.  No acute airspace disease suggested.    CT Head 3/29/2017 Impression: extensive chronic ischemic changes throughout both hemispheres with   atrophy. This includes multiple old lacunar infarcts. Diffuse white matter disease. Old right MCA infarct. A right the internal capsule infarct there may be late subacute or chronic. no hemorrhagic lesion or mass. No midline shift.

## 2017-03-30 NOTE — DIETITIAN INITIAL EVALUATION ADULT. - NS AS NUTRI INTERV MEDICAL AND FOOD SUPPLEMENTS
Commercial beverage/As medically appropriate if able to consume PO/Commercial food Add Ensure Enlive x 2/day (provides 700 kcal, 40 g protein)/Commercial beverage

## 2017-03-30 NOTE — DISCHARGE NOTE ADULT - PLAN OF CARE
return to baseline mental status treat underlying medical condition frequent turning, out of bed to chair complete antibiotics as ordered aspirin and statin can stop glipizide, cont with metformin aspirin and statin for HISTORY OF STROKE, no evidence of acute stroke

## 2017-03-30 NOTE — DISCHARGE NOTE ADULT - MEDICATION SUMMARY - MEDICATIONS TO CHANGE
I will SWITCH the dose or number of times a day I take the medications listed below when I get home from the hospital:    metoprolol 100 mg oral tablet  --  by mouth once a day

## 2017-03-30 NOTE — DISCHARGE NOTE ADULT - REASON FOR ADMISSION
noted unresponsive with right gaze deviation and neck turned to the right side and left upper and lower extremity weakness noted Monday midday pt was ir her usoh non ambulatory bedbound except for being lifted to wheelchair eating pruee diet unresponsiveness

## 2017-03-30 NOTE — DIETITIAN INITIAL EVALUATION ADULT. - OTHER INFO
Pt seen for consult - chewing/swallowing difficulty. Pt of advanced age; unable to interview due to current medical condition; no family present. Swallow eval pending; NPO status until then; pt c CVA, PMhx of dysphagia; per chart note, on pureed consistency diet at home; lives c children. Pt c DM, takes Glipizide & Metformin to control BG levels at home; no dosage specified. No reports of N/V. Supportive/Palliative care discussed c family. Pt seen for consult - chewing/swallowing difficulty. Pt of advanced age; unable to interview due to current medical condition; no family present. Swallow eval (3/30) recommends pureed/thin liquids; pt c CVA, PMhx of dysphagia; per chart note, on pureed consistency diet at home; lives c children. Pt c DM, takes Glipizide & Metformin to control BG levels at home; no dosage specified. No reports of N/V. DNR in place; comfort care only & no tube feed per MOLST.

## 2017-03-31 VITALS
HEART RATE: 80 BPM | DIASTOLIC BLOOD PRESSURE: 78 MMHG | OXYGEN SATURATION: 98 % | SYSTOLIC BLOOD PRESSURE: 140 MMHG | RESPIRATION RATE: 18 BRPM

## 2017-03-31 PROCEDURE — 99239 HOSP IP/OBS DSCHRG MGMT >30: CPT

## 2017-03-31 RX ORDER — METOPROLOL TARTRATE 50 MG
1 TABLET ORAL
Qty: 30 | Refills: 0 | OUTPATIENT
Start: 2017-03-31 | End: 2017-04-30

## 2017-03-31 RX ORDER — CEFACLOR 125 MG/5ML
1 SUSPENSION, RECONSTITUTED, ORAL (ML) ORAL
Qty: 21 | Refills: 0 | OUTPATIENT
Start: 2017-03-31 | End: 2017-04-07

## 2017-03-31 RX ORDER — ATORVASTATIN CALCIUM 80 MG/1
1 TABLET, FILM COATED ORAL
Qty: 30 | Refills: 0 | OUTPATIENT
Start: 2017-03-31 | End: 2017-04-30

## 2017-03-31 RX ORDER — METFORMIN HYDROCHLORIDE 850 MG/1
1 TABLET ORAL
Qty: 60 | Refills: 0 | OUTPATIENT
Start: 2017-03-31 | End: 2017-04-30

## 2017-03-31 RX ORDER — ASPIRIN/CALCIUM CARB/MAGNESIUM 324 MG
1 TABLET ORAL
Qty: 30 | Refills: 0 | OUTPATIENT
Start: 2017-03-31 | End: 2017-04-30

## 2017-03-31 RX ADMIN — HEPARIN SODIUM 5000 UNIT(S): 5000 INJECTION INTRAVENOUS; SUBCUTANEOUS at 06:35

## 2017-03-31 RX ADMIN — Medication 81 MILLIGRAM(S): at 11:59

## 2017-03-31 NOTE — PHYSICAL THERAPY INITIAL EVALUATION ADULT - ADDITIONAL COMMENTS
PT spoke with pt's daughter at bedside whom provided social history/prior level of function. Pt at her functional baseline (dependent). Pt has home health aide 12 hours/day then family provides additional assist. Pt is non-verbal & non-ambulatory at baseline. Pt sleeps in hospital bed. Family has tilt in space wheelchair. Pt spends 99% of the time in bed. Pt transferred OOB to wheelchair 1-2x/week via patient lift device (hand-crank operated). Family inquiring about electronic operated patient lift device. Family endorses working glucometer at home. No off-loading cushion for wheelchair or recliner. Goal of therapy: try antibiotics otherwise hospice care.

## 2017-03-31 NOTE — OCCUPATIONAL THERAPY INITIAL EVALUATION ADULT - RANGE OF MOTION EXAMINATION, UPPER EXTREMITY
Patient unable to follow commands for AROM test/bilateral UE Passive ROM was WNL (within normal limits)

## 2017-03-31 NOTE — OCCUPATIONAL THERAPY INITIAL EVALUATION ADULT - ORIENTATION, REHAB EVAL
not oriented to person, place, time or situation/Patient is functioning at a level 1 - arousal on the [facility] cognitive continuum. Pt has difficulty initiating attention to purposeful tasks. The patient behaviour is purposeless, reflexive, inconsistent, and dependent in all functional area. Pt. may show some visual tracking and is usually non verbal.

## 2017-03-31 NOTE — PHYSICAL THERAPY INITIAL EVALUATION ADULT - PERTINENT HX OF CURRENT PROBLEM, REHAB EVAL
Pt is a 89yo non-verbal F admitted secondary to being found unresponsive with right gaze deviation, & weakness. A1c: 6.8. TTE: EF = 50-55% & grade I diastolic dysfunction. US Carotid Duplex B/L: intimal thickening & scattered plaque in both carotid arteries without significant stenosis or velocity elevations, patent vertebrals....cont below...

## 2017-03-31 NOTE — OCCUPATIONAL THERAPY INITIAL EVALUATION ADULT - GENERAL OBSERVATIONS, REHAB EVAL
Pt was encountered supine in bed with daughter Vaishnavi present; hx of dementia and stroke, NAD, head deviated towards R side, Eye gaze preference towards R side, non-verbal ,alert, not able to follow commands, RUE flexor spasticity, LUE hypertonicity, RLE hypertonicity, LLE mild increased tonicity.

## 2017-03-31 NOTE — OCCUPATIONAL THERAPY INITIAL EVALUATION ADULT - PERTINENT HX OF CURRENT PROBLEM, REHAB EVAL
Patient admitted to [facility] due to R sided gaze and not eating for past 3 days. Cat scan on 3/29/17 revealed  extensive chronic ischemic changes throughout both hemispheres with atrophy. This includes multiple old lacunar infarcts. Diffuse white matter disease. Old right MCA infarct. A right the internal capsule infarct there may be late subacute or chronic

## 2017-03-31 NOTE — OCCUPATIONAL THERAPY INITIAL EVALUATION ADULT - MUSCLE TONE ASSESSMENT, REHAB EVAL
mildly increased tone./hypertonic/mildly increased tone/Right UE/Left UE/bilateral lower extremities

## 2017-03-31 NOTE — OCCUPATIONAL THERAPY INITIAL EVALUATION ADULT - ADDITIONAL COMMENTS
As per patients daughter, patient is completely dependent with all ADLS/IADLS prior to hospitalization. Patient is bed bound and patient and aides perform all ADLS on patient while patient is in bed. Patient has a eliezer lift to transfer patient from bed to bedside recliner chair. Patient is unable to state wants and needs at this time.

## 2017-03-31 NOTE — PHYSICAL THERAPY INITIAL EVALUATION ADULT - MODIFIED CLINICAL TEST OF SENSORY INTEGRATION IN BALANCE TEST
Barthel Index: Feeding Score _0/10__, Bathing Score _0/5__, Grooming Score _0/5__, Dressing Score _0/10__, Bowels Score __0/10_, Bladder Score _0/10__, Toilet Score _0/10__, Transfers Score _0/15__, Mobility Score __0/15_, Stairs Score _0/10__,     Total Score __0_/100

## 2017-03-31 NOTE — OCCUPATIONAL THERAPY INITIAL EVALUATION ADULT - MODIFIED CLINICAL TEST OF SENSORY INTEGRATION IN BALANCE TEST
Barthel Index: Feeding Score___0___, Bathing Score___0___, Grooming Score__0___, Dressing Score__0___, Bowel Score__0___, Bladder Score____0__, Toilet Score___0__, Transfer Score___0___, Mobility Score___0__, Stairs Score___0__, Total Score__0___.

## 2017-03-31 NOTE — OCCUPATIONAL THERAPY INITIAL EVALUATION ADULT - RANGE OF MOTION EXAMINATION, LOWER EXTREMITY
bilateral LE Passive ROM was WNL (within normal limits)/Patient unable to follow commands for AROM test

## 2019-10-16 NOTE — PHYSICAL THERAPY INITIAL EVALUATION ADULT - HEALTH SCREEN CRITERIA
Subsequent Medicare Annual Wellness Assessment:    Risk factors for conditions for which interventions are recommended:  1.  Continue levothyroxine.  Once a person starts thyroid replacement, it is typically needed for life.  Please have thyroid surveillance labs the next time you have labs for Dr. Man.  2.  Work on increasing your oral intake.  As your care team is guiding you, maintaining adequate caloric intake by mouth is needed before your feeding tube would be removed.  3.  Continue protein shake supplementation.  4.  Pilocarpine can cause sweating as a side effect.  You could alternatively try Flonase nasal spray to reduce nasal drainage.    Current other health providers:  Dr. Caballero - Primary care physician  Dr. Man - Oncologist  Dr. Vela - Urologist  Dr. Robertson - Ophthalmologist  Dr. Ball - Podiatrist    Current suppliers of other medical goods and services:  [employer] - Local Pharmacy    Evidence of cognitive impairment:  No signs of dementia.    Advice/referrals for health education/preventive counseling services or programs:  1. Regular exercise with walking or cycling if you are capable for 30 minutes five days weekly is recommended.  2. Consider seeing Courtney Naidu, our dietician at the [place] office, should you desire diet counseling.    Screening schedule/checklist for next 5-10 years:  1.  I recommend that you get a flu shot yearly.  You received this on 10/14/2019.   2.  There is a new shingles vaccination, called Shingrix.  It consists of a two-shot series over the course of 2-6 months.  We do recommend this vaccination.  Please inquire at your local pharmacy should you wish to receive this vaccination series.  3.  Pneumovax was given 01/12/2015. It will not need to be repeated. Prevnar 13 was given 11/6/2017. It will not need to be repeated.   4.  Tetanus vaccine is recommended.  However, Medicare does not cover this vaccination unless you lacerate/cut  yes yourself.  Should you desire this vaccination, please check on pricing/availability at your local pharmacy.  5.  Colon and prostate cancer screening is no longer recommended on the basis of advancing age.    Follow-up:  Follow-up with Dr. Caballero in 6 months.  Call with any questions or concerns.

## 2023-03-30 NOTE — ED ADULT NURSE NOTE - CADM POA PRESS ULCER
No Sotyktu Pregnancy And Lactation Text: There is insufficient data to evaluate whether or not Sotyktu is safe to use during pregnancy.? ?It is not known if Sotyktu passes into breast milk and whether or not it is safe to use when breastfeeding.??
